# Patient Record
Sex: FEMALE
[De-identification: names, ages, dates, MRNs, and addresses within clinical notes are randomized per-mention and may not be internally consistent; named-entity substitution may affect disease eponyms.]

---

## 2024-04-09 ENCOUNTER — RX ONLY (RX ONLY)
Age: 69
End: 2024-04-09

## 2024-04-09 ENCOUNTER — OFFICE (OUTPATIENT)
Facility: LOCATION | Age: 69
Setting detail: OPHTHALMOLOGY
End: 2024-04-09
Payer: MEDICAID

## 2024-04-09 DIAGNOSIS — H25.13: ICD-10-CM

## 2024-04-09 DIAGNOSIS — H16.223: ICD-10-CM

## 2024-04-09 DIAGNOSIS — H40.013: ICD-10-CM

## 2024-04-09 PROCEDURE — 92083 EXTENDED VISUAL FIELD XM: CPT | Performed by: OPTOMETRIST

## 2024-04-09 PROCEDURE — 92250 FUNDUS PHOTOGRAPHY W/I&R: CPT | Performed by: OPTOMETRIST

## 2024-04-09 PROCEDURE — 99213 OFFICE O/P EST LOW 20 MIN: CPT | Performed by: OPTOMETRIST

## 2024-07-16 PROBLEM — Z00.00 ENCOUNTER FOR PREVENTIVE HEALTH EXAMINATION: Status: ACTIVE | Noted: 2024-07-16

## 2024-07-18 ENCOUNTER — APPOINTMENT (OUTPATIENT)
Dept: CARDIOLOGY | Facility: CLINIC | Age: 69
End: 2024-07-18
Payer: MEDICARE

## 2024-07-18 VITALS
BODY MASS INDEX: 26.11 KG/M2 | HEART RATE: 67 BPM | WEIGHT: 133 LBS | HEIGHT: 59.84 IN | SYSTOLIC BLOOD PRESSURE: 120 MMHG | DIASTOLIC BLOOD PRESSURE: 76 MMHG | RESPIRATION RATE: 16 BRPM | OXYGEN SATURATION: 97 %

## 2024-07-18 DIAGNOSIS — R06.00 DYSPNEA, UNSPECIFIED: ICD-10-CM

## 2024-07-18 PROCEDURE — G2211 COMPLEX E/M VISIT ADD ON: CPT

## 2024-07-18 PROCEDURE — 93000 ELECTROCARDIOGRAM COMPLETE: CPT

## 2024-07-18 PROCEDURE — 99204 OFFICE O/P NEW MOD 45 MIN: CPT

## 2024-08-22 ENCOUNTER — APPOINTMENT (OUTPATIENT)
Dept: CARDIOLOGY | Facility: CLINIC | Age: 69
End: 2024-08-22
Payer: MEDICARE

## 2024-08-22 VITALS — SYSTOLIC BLOOD PRESSURE: 118 MMHG | DIASTOLIC BLOOD PRESSURE: 73 MMHG

## 2024-08-22 PROCEDURE — 93306 TTE W/DOPPLER COMPLETE: CPT

## 2024-08-22 PROCEDURE — G2211 COMPLEX E/M VISIT ADD ON: CPT

## 2024-08-22 PROCEDURE — 99214 OFFICE O/P EST MOD 30 MIN: CPT

## 2024-08-22 NOTE — DISCUSSION/SUMMARY
[FreeTextEntry1] : 68 yo lady PMHx of CKD, trace TR (TTE 2022), and HLD and FMHx of early CAD/MI (mother - age 55) who is here as a f/u  EKG 7/18/24 Sinus los  Assessment 1. HTN 2. HLD 3. Dyspnea on exertion 4. FMHx of CAD and cardiovascular disease  Plan 1. CT coronary IV contrast done w/ Ca score 0 so w/o CAD. CT revealed incidental pulm nodule and hepatic cyst, advised to f/u with PCP within 1mo to discuss 2. TTE done today given dyspnea, will review 3. Lipitor 20mg PO QHS 4. Does not need BP meds BP today 118/73 5. RTC 6mo  During non face-to-face time, I reviewed relevant portions of the patient's medical record. During face-to-face time, I took a relevant history and examined the patient. I also explained differential diagnoses, relevant cardiac diagnoses, workup, and management plan, which required a moderate level of medical decision making. I answered all questions related to the patient's medical conditions.   Jose Herbert M.D. Attending Cardiologist [facility]

## 2024-08-22 NOTE — HISTORY OF PRESENT ILLNESS
[FreeTextEntry1] : 68 yo lady PMHx of CKD, trace TR (TTE 2022), and HLD and FMHx of early CAD/MI (mother - age 55) who is here as a f/u  08/22/24 ROS + intermittent dyspnea on exertion. No CP. 07/18/24 ROS + dyspnea on exertion. Reports extensive FMHx of ASCVD and cardiac disease - father w/ stent, mother w/ MI at age 55, cousin with stent, and sister with grandchild with congenital CVD.

## 2024-10-08 ENCOUNTER — OFFICE (OUTPATIENT)
Facility: LOCATION | Age: 69
Setting detail: OPHTHALMOLOGY
End: 2024-10-08
Payer: MEDICARE

## 2024-10-08 DIAGNOSIS — H43.813: ICD-10-CM

## 2024-10-08 DIAGNOSIS — H40.013: ICD-10-CM

## 2024-10-08 DIAGNOSIS — H16.223: ICD-10-CM

## 2024-10-08 DIAGNOSIS — H25.13: ICD-10-CM

## 2024-10-08 PROBLEM — H35.40 PERIPAPILLARY ATROPHY: Status: ACTIVE | Noted: 2024-10-08

## 2024-10-08 PROCEDURE — 92014 COMPRE OPH EXAM EST PT 1/>: CPT | Performed by: OPTOMETRIST

## 2024-10-08 PROCEDURE — 92133 CPTRZD OPH DX IMG PST SGM ON: CPT | Performed by: OPTOMETRIST

## 2024-10-08 PROCEDURE — 92202 OPSCPY EXTND ON/MAC DRAW: CPT | Performed by: OPTOMETRIST

## 2024-10-08 ASSESSMENT — REFRACTION_MANIFEST
OD_VA1: 20/20-2
OD_SPHERE: -0.75
OS_CYLINDER: -0.75
OS_SPHERE: -2.00
OS_AXIS: 007
OD_CYLINDER: -1.00
OS_VA1: 20/30
OD_AXIS: 117

## 2024-10-08 ASSESSMENT — TONOMETRY
OS_IOP_MMHG: 18
OD_IOP_MMHG: 18

## 2024-10-08 ASSESSMENT — KERATOMETRY
OD_AXISANGLE_DEGREES: 057
OS_AXISANGLE_DEGREES: 102
OS_K1POWER_DIOPTERS: 47.25
OD_K2POWER_DIOPTERS: 48.75
METHOD_AUTO_MANUAL: AUTO
OS_K2POWER_DIOPTERS: 49.50
OD_K1POWER_DIOPTERS: 47.00

## 2024-10-08 ASSESSMENT — VISUAL ACUITY
OD_BCVA: 20/50-2
OS_BCVA: 20/30-2

## 2024-10-08 ASSESSMENT — TEAR BREAK UP TIME (TBUT)
OD_TBUT: 2+
OS_TBUT: 2+

## 2024-10-08 ASSESSMENT — REFRACTION_AUTOREFRACTION
OD_AXIS: 117
OS_CYLINDER: -0.75
OS_SPHERE: -2.00
OD_SPHERE: -0.75
OS_AXIS: 007
OD_CYLINDER: -1.00

## 2024-10-08 ASSESSMENT — CONFRONTATIONAL VISUAL FIELD TEST (CVF)
OS_FINDINGS: FULL
OD_FINDINGS: FULL

## 2025-02-20 ENCOUNTER — APPOINTMENT (OUTPATIENT)
Dept: CARDIOLOGY | Facility: CLINIC | Age: 70
End: 2025-02-20
Payer: MEDICARE

## 2025-02-20 VITALS
WEIGHT: 134 LBS | SYSTOLIC BLOOD PRESSURE: 123 MMHG | BODY MASS INDEX: 26.31 KG/M2 | HEART RATE: 67 BPM | OXYGEN SATURATION: 98 % | HEIGHT: 59.84 IN | RESPIRATION RATE: 16 BRPM | DIASTOLIC BLOOD PRESSURE: 78 MMHG

## 2025-02-20 PROCEDURE — 99214 OFFICE O/P EST MOD 30 MIN: CPT

## 2025-02-20 PROCEDURE — G2211 COMPLEX E/M VISIT ADD ON: CPT

## 2025-02-20 RX ORDER — GUAIFENESIN 200 MG 200 MG/1
200 TABLET ORAL EVERY 4 HOURS
Qty: 90 | Refills: 0 | Status: ACTIVE | COMMUNITY
Start: 2025-02-20 | End: 1900-01-01

## 2025-04-22 ENCOUNTER — OFFICE (OUTPATIENT)
Facility: LOCATION | Age: 70
Setting detail: OPHTHALMOLOGY
End: 2025-04-22
Payer: MEDICARE

## 2025-04-22 DIAGNOSIS — H16.223: ICD-10-CM

## 2025-04-22 DIAGNOSIS — H40.013: ICD-10-CM

## 2025-04-22 DIAGNOSIS — H25.13: ICD-10-CM

## 2025-04-22 PROCEDURE — 92250 FUNDUS PHOTOGRAPHY W/I&R: CPT | Performed by: OPTOMETRIST

## 2025-04-22 PROCEDURE — 92083 EXTENDED VISUAL FIELD XM: CPT | Performed by: OPTOMETRIST

## 2025-04-22 PROCEDURE — 99213 OFFICE O/P EST LOW 20 MIN: CPT | Performed by: OPTOMETRIST

## 2025-04-22 ASSESSMENT — KERATOMETRY
OD_AXISANGLE_DEGREES: 057
OS_K1POWER_DIOPTERS: 47.25
OS_K2POWER_DIOPTERS: 49.50
OD_K2POWER_DIOPTERS: 48.75
OD_K1POWER_DIOPTERS: 47.00
METHOD_AUTO_MANUAL: AUTO
OS_AXISANGLE_DEGREES: 102

## 2025-04-22 ASSESSMENT — REFRACTION_MANIFEST
OS_CYLINDER: +1.00
OD_AXIS: 110
OD_CYLINDER: --1.25
OD_VA1: 20/25
OS_VA1: 20/25+1
OS_AXIS: 003
OD_SPHERE: -0.75
OS_SPHERE: -2.00

## 2025-04-22 ASSESSMENT — TEAR BREAK UP TIME (TBUT)
OS_TBUT: 2+
OD_TBUT: 2+

## 2025-04-22 ASSESSMENT — VISUAL ACUITY
OD_BCVA: 20/50-2
OS_BCVA: 20/30-2

## 2025-04-22 ASSESSMENT — REFRACTION_AUTOREFRACTION
OD_AXIS: 110
OD_SPHERE: -0.75
OS_CYLINDER: +1.00
OD_CYLINDER: --1.25
OS_AXIS: 003
OS_SPHERE: -2.00

## 2025-04-22 ASSESSMENT — TONOMETRY
OS_IOP_MMHG: 14
OD_IOP_MMHG: 13

## 2025-07-30 ENCOUNTER — OFFICE (OUTPATIENT)
Facility: LOCATION | Age: 70
Setting detail: OPHTHALMOLOGY
End: 2025-07-30
Payer: MEDICARE

## 2025-07-30 DIAGNOSIS — H16.223: ICD-10-CM

## 2025-07-30 DIAGNOSIS — H25.13: ICD-10-CM

## 2025-07-30 DIAGNOSIS — H40.013: ICD-10-CM

## 2025-07-30 PROCEDURE — 99213 OFFICE O/P EST LOW 20 MIN: CPT | Performed by: OPTOMETRIST

## 2025-07-30 PROCEDURE — 92083 EXTENDED VISUAL FIELD XM: CPT | Performed by: OPTOMETRIST

## 2025-07-30 ASSESSMENT — KERATOMETRY
OS_K1POWER_DIOPTERS: 47.25
OD_K2POWER_DIOPTERS: 48.75
OS_K2POWER_DIOPTERS: 49.50
METHOD_AUTO_MANUAL: AUTO
OD_K1POWER_DIOPTERS: 47.00
OD_AXISANGLE_DEGREES: 057
OS_AXISANGLE_DEGREES: 102

## 2025-07-30 ASSESSMENT — VISUAL ACUITY
OD_BCVA: 20/50-2
OS_BCVA: 20/30-2

## 2025-07-30 ASSESSMENT — CONFRONTATIONAL VISUAL FIELD TEST (CVF)
OS_FINDINGS: FULL
OD_FINDINGS: FULL

## 2025-07-30 ASSESSMENT — REFRACTION_AUTOREFRACTION
OS_SPHERE: -1.75
OS_CYLINDER: -0.75
OS_AXIS: 178
OD_CYLINDER: -1.00
OD_AXIS: 106
OD_SPHERE: -0.75

## 2025-07-30 ASSESSMENT — REFRACTION_MANIFEST
OS_VA1: 20/25+1
OS_AXIS: 178
OS_SPHERE: -1.75
OD_AXIS: 106
OD_SPHERE: -0.75
OD_VA1: 20/20-2
OS_CYLINDER: -0.75
OD_CYLINDER: -1.00

## 2025-07-30 ASSESSMENT — TEAR BREAK UP TIME (TBUT)
OS_TBUT: 2+
OD_TBUT: 2+

## 2025-07-30 ASSESSMENT — TONOMETRY
OS_IOP_MMHG: 16
OD_IOP_MMHG: 16

## 2025-09-09 ENCOUNTER — NON-APPOINTMENT (OUTPATIENT)
Age: 70
End: 2025-09-09

## 2025-09-11 ENCOUNTER — APPOINTMENT (OUTPATIENT)
Dept: CARDIOLOGY | Facility: CLINIC | Age: 70
End: 2025-09-11
Payer: MEDICARE

## 2025-09-11 VITALS — SYSTOLIC BLOOD PRESSURE: 119 MMHG | DIASTOLIC BLOOD PRESSURE: 78 MMHG

## 2025-09-11 PROCEDURE — 93306 TTE W/DOPPLER COMPLETE: CPT

## 2025-09-11 PROCEDURE — G2211 COMPLEX E/M VISIT ADD ON: CPT

## 2025-09-11 PROCEDURE — 99214 OFFICE O/P EST MOD 30 MIN: CPT
